# Patient Record
Sex: MALE | Race: WHITE | NOT HISPANIC OR LATINO | Employment: FULL TIME | URBAN - METROPOLITAN AREA
[De-identification: names, ages, dates, MRNs, and addresses within clinical notes are randomized per-mention and may not be internally consistent; named-entity substitution may affect disease eponyms.]

---

## 2020-05-20 ENCOUNTER — VIRTUAL VISIT (OUTPATIENT)
Dept: FAMILY MEDICINE | Facility: OTHER | Age: 30
End: 2020-05-20

## 2020-05-21 NOTE — PROGRESS NOTES
"Date: 2020 21:14:49  Clinician: Diallo Rangel  Clinician NPI: [id number]  Patient: Jeffery Saleem  Patient : 1990  Patient Address: [address] [place] [address]  Patient Phone: [phone]  Visit Protocol: URI  Patient Summary:  Jeffery is a 29 year old ( : 1990 ) male who initiated a Visit for cold, sinus infection, or influenza. When asked the question \"Please sign me up to receive news, health information and promotions from [employer].\", Jeffery responded \"No\".    Jeffery states his symptoms started suddenly 3-4 days ago.   His symptoms consist of a sore throat and enlarged lymph nodes. Jeffery also feels feverish.   Symptom details     Sore throat: Jeffery reports having moderate throat pain (4-6 on a 10 point pain scale), has exudate on his tonsils, and can swallow liquids. The lymph nodes in his neck are enlarged. A rash has not appeared on the skin since the sore throat started.     Temperature: His current temperature is 99.5 degrees Fahrenheit.      Jeffery denies having nausea, teeth pain, ageusia, diarrhea, facial pain or pressure, chills, wheezing, cough, nasal congestion, vomiting, rhinitis, ear pain, headache, malaise, myalgias, and anosmia. He also denies having recent facial or sinus surgery in the past 60 days, double sickening (worsening symptoms after initial improvement), and taking antibiotic medication for the symptoms. He is not experiencing dyspnea.   Precipitating events  Jeffery is not sure if he has been exposed to someone with strep throat.   Pertinent COVID-19 (Coronavirus) information  In the past 14 days, Jeffery has not worked in a congregate living setting.   He does not work or volunteer as healthcare worker or a  and does not work or volunteer in a healthcare facility.   Jeffery also has not lived in a congregate living setting in the past 14 days. He does not live with a healthcare worker.   Jeffery has not had a close contact with a laboratory-confirmed " COVID-19 patient within 14 days of symptom onset.   Triage Point(s) temporarily suspended for COVID-19 (Coronavirus) screening  Jeffery reported the following symptoms which were previously protocol referral points. These protocol referral points have temporarily been removed for purposes of COVID-19 (Coronavirus) screening.   Meets at least 3/5 centor score criteria     Swollen lymph nodes    Exudate on tonsils    Absence of cough         Pertinent medical history  Jeffery does not need a return to work/school note.   Weight: 152 lbs   Jeffery does not smoke or use smokeless tobacco.   Additional information as reported by the patient (free text): Have had strep before a long time ago, and this seems the exact same.   Weight: 152 lbs    MEDICATIONS: Vicks DayQuil-NyQuil oral, Tylenol oral, ALLERGIES: NKDA  Clinician Response:  Dear Jeffery,   your symptoms are most consistent with Strep throat. We will begin treatment with an antibiotic for this. You should improve within 48-72 hours. If you do not please return to [facility] or follow up with your PCP. this could be indicative of a different ailment.  Additionally, we can not rule out COVID 19 and do not meet criteria to be tested by [facility] at this time. Please take these precautions until we know if you respond to the medication prescribed today  How can I protect others?  Without a test, we can't know for sure that you have COVID-19. For safety, it's very important to follow these rules.  First, stay home and away from others (self-isolate) until:   You've had no fever---and no medicine that reduces fever---for 3 full days (72 hours). And...  Your other symptoms have gotten better. For example, your cough or breathing has improved. And...  At least 10 days have passed since your symptoms started.   During this time:   Don't go to work, school or anywhere else.  Stay away from others in your home. No hugging, kissing or shaking hands.  Don't let anyone visit.  Cover  your mouth and nose with a mask, tissue or wash cloth to avoid spreading germs.  Wash your hands and face often. Use soap and water.   How can I take care of myself?  1.Take Tylenol (acetaminophen) for fever or pain. If you have liver or kidney problems, ask your family doctor if it's okay to take Tylenol.  Adults can take either:   650 mg (two 325 mg pills) every 4 to 6 hours, or...  1,000 mg (two 500 mg pills) every 8 hours as needed.  Note: Don't take more than 3,000 mg in one day.  For children, check the Tylenol bottle for the right dose. The dose is based on the child's age or weight.     Diagnosis: Streptococcal pharyngitis  Diagnosis ICD: J02.0  Prescription: amoxicillin 500 mg oral capsule 30 capsule, 10 days supply. Take 1 capsule by mouth every 8 hours for 10 days. Refills: 0, Refill as needed: no, Allow substitutions: yes  Pharmacy: [facility]/pharmacy #[id number] - [phone] - [address] [place] [address]

## 2021-01-21 ENCOUNTER — VIRTUAL VISIT (OUTPATIENT)
Dept: FAMILY MEDICINE | Facility: OTHER | Age: 31
End: 2021-01-21

## 2021-01-21 NOTE — PROGRESS NOTES
"Date: 2021 09:38:51  Clinician: Estefani Palacios  Clinician NPI: [id number]  Patient: Jeffery Saleem  Patient : 1990  Patient Address: [address] [place] [address]  Patient Phone: [phone]  Visit Protocol: URI  Patient Summary:  Jeffery is a 30 year old ( : 1990 ) male who initiated a [facility] Visit for cold, sinus infection, or influenza. When asked the question \"Please sign me up to receive news, health information and promotions from [facility].\", Jeffery responded \"No\".    Jeffery states his symptoms started gradually 3-4 days ago. After his symptoms started, they improved and then got worse again.   His symptoms consist of malaise and a sore throat.   Symptom details   Sore throat: Jeffery reports having mild throat pain (1-3 on a 10 point pain scale), has exudate on his tonsils, and can swallow liquids. The lymph nodes in his neck are not enlarged. A rash has not appeared on the skin since the sore throat started.    Jeffery denies having ear pain, headache, chills, vomiting, myalgias, rhinitis, fever, enlarged lymph nodes, cough, nasal congestion, nausea, teeth pain, ageusia, diarrhea, wheezing, facial pain or pressure, and anosmia. He also denies having recent facial or sinus surgery in the past 60 days and taking antibiotic medication in the past month. He is not experiencing dyspnea.   Precipitating events  Jeffery is not sure if he has been exposed to someone with strep throat.   Pertinent COVID-19 (Coronavirus) information  Jeffery does not work or volunteer as healthcare worker or a . In the past 14 days, Jeffery has not worked or volunteered at a healthcare facility or group living setting.   In the past 14 days, he also has not lived in a congregate living setting.   Jeffery has not had a close contact with a laboratory-confirmed COVID-19 patient within 14 days of symptom onset.    Jeffery has been tested for COVID-19.      Date(s) of his COVID-19 test as reported by the patient (free " text): 01/21/2021       Result of COVID-19 test as reported by the patient (free text): Negative       Type of test as reported by the patient (free text): Nasal, rapid        Jeffery has not received a COVID-19 vaccine.   Pertinent medical history  He has not been told by his provider to avoid NSAIDs.   Jeffery does not have diabetes. He denies having immunosuppressive conditions (e.g., chemotherapy, HIV, organ transplant, long-term use of steroids or other immunosuppressive medications, splenectomy). He denies having congestive heart failure and severe COPD. He does not have asthma.   Jeffery does not need a return to work/school note.   Jeffery does not smoke or use smokeless tobacco.   Additional information as reported by the patient (free text): Also had a non-rapid deep nasal COVID test done at first signs of sore throat in addition to today's (1/21) rapid test. Both negative.   Weight: 155 lbs    MEDICATIONS: Tylenol oral, Vicks DayQuil-NyQuil oral, ALLERGIES: NKDA  Clinician Response:  Dear Jeffery,  Based on the information provided, you have viral pharyngitis. This is a sore throat caused by a virus and is usually the first sign of a cold. Your sore throat should resolve in a couple days as other cold symptoms develop.  Unfortunately, there are no medications that can cure a cold, so treatment is focused on controlling symptoms as much as possible until you recover. Most people gradually feel better in 1-2 weeks.  Medication information  Because you have a viral infection, antibiotics will not help you get better. Treating a viral infection with antibiotics could actually make you feel worse.  Unless you are allergic to the over-the-counter medication(s) below, I recommend using:       Acetaminophen (Tylenol or store brand) oral tablet. Take 1-2 tablets by mouth every 4-6 hours to help with the discomfort.      Ibuprofen (Advil or store brand) 200 mg oral tablet. Take 1-3 tablets (200-600 mg) by mouth every 8 hours to  help with the discomfort. Make sure to take the ibuprofen with food. Do not exceed 2400 mg in 24 hours.     Over-the-counter medications do not require a prescription. Ask the pharmacist if you have any questions.  Self care  Steps you can take to be as comfortable as possible:     Rest.    Drink plenty of fluids.    Use throat lozenges.    Suck on frozen items such as popsicles.    Drink hot tea with lemon and honey.    Gargle with warm salt water (1/4 teaspoon of salt per 8 ounce glass of water).     When to seek care  Please be seen in a clinic or urgent care if any of the following occur:   New symptoms develop, or symptoms become worse   Call ahead before going to the clinic or urgent care.  Call 911 or go to the emergency room if you feel that your throat is closing off, you suddenly develop a rash, you are unable to swallow fluids, you are drooling, or you are having difficulty breathing.  For the latest updates on COVID-19 (Coronavirus), please visit the Centers for Disease Control and Prevention (CDC).   Diagnosis: Viral pharyngitis  Diagnosis ICD: J02.9

## 2022-05-19 ASSESSMENT — ENCOUNTER SYMPTOMS
MYALGIAS: 0
HEMATOCHEZIA: 0
DYSURIA: 0
SORE THROAT: 0
HEMATURIA: 0
HEADACHES: 0
EYE PAIN: 0
ARTHRALGIAS: 0
PALPITATIONS: 0
FEVER: 0
NERVOUS/ANXIOUS: 0
WEAKNESS: 0
COUGH: 1
DIZZINESS: 0
FREQUENCY: 0
NAUSEA: 0
ABDOMINAL PAIN: 0
JOINT SWELLING: 0
HEARTBURN: 0
PARESTHESIAS: 0
CONSTIPATION: 0
DIARRHEA: 0
CHILLS: 0
SHORTNESS OF BREATH: 0

## 2022-05-20 ENCOUNTER — OFFICE VISIT (OUTPATIENT)
Dept: FAMILY MEDICINE | Facility: CLINIC | Age: 32
End: 2022-05-20
Payer: COMMERCIAL

## 2022-05-20 VITALS
TEMPERATURE: 98.4 F | OXYGEN SATURATION: 100 % | HEIGHT: 67 IN | DIASTOLIC BLOOD PRESSURE: 70 MMHG | HEART RATE: 81 BPM | SYSTOLIC BLOOD PRESSURE: 102 MMHG | WEIGHT: 151 LBS | BODY MASS INDEX: 23.7 KG/M2

## 2022-05-20 DIAGNOSIS — Z13.6 ENCOUNTER FOR LIPID SCREENING FOR CARDIOVASCULAR DISEASE: ICD-10-CM

## 2022-05-20 DIAGNOSIS — Z13.220 ENCOUNTER FOR LIPID SCREENING FOR CARDIOVASCULAR DISEASE: ICD-10-CM

## 2022-05-20 DIAGNOSIS — R09.82 POST-NASAL DRIP: ICD-10-CM

## 2022-05-20 DIAGNOSIS — Z00.00 ROUTINE GENERAL MEDICAL EXAMINATION AT A HEALTH CARE FACILITY: Primary | ICD-10-CM

## 2022-05-20 DIAGNOSIS — Z11.4 SCREENING FOR HIV (HUMAN IMMUNODEFICIENCY VIRUS): ICD-10-CM

## 2022-05-20 PROCEDURE — 99213 OFFICE O/P EST LOW 20 MIN: CPT | Mod: 25 | Performed by: FAMILY MEDICINE

## 2022-05-20 PROCEDURE — 99395 PREV VISIT EST AGE 18-39: CPT | Performed by: FAMILY MEDICINE

## 2022-05-20 RX ORDER — AZELASTINE 1 MG/ML
1 SPRAY, METERED NASAL 2 TIMES DAILY
Qty: 30 ML | Refills: 2 | Status: SHIPPED | OUTPATIENT
Start: 2022-05-20

## 2022-05-20 ASSESSMENT — ENCOUNTER SYMPTOMS
JOINT SWELLING: 0
PARESTHESIAS: 0
FREQUENCY: 0
SORE THROAT: 0
HEMATURIA: 0
DYSURIA: 0
CONSTIPATION: 0
HEADACHES: 0
COUGH: 1
ARTHRALGIAS: 0
FEVER: 0
DIZZINESS: 0
EYE PAIN: 0
CHILLS: 0
NAUSEA: 0
ABDOMINAL PAIN: 0
SHORTNESS OF BREATH: 0
HEMATOCHEZIA: 0
HEARTBURN: 0
PALPITATIONS: 0
DIARRHEA: 0
MYALGIAS: 0
WEAKNESS: 0
NERVOUS/ANXIOUS: 0

## 2022-05-20 ASSESSMENT — PAIN SCALES - GENERAL: PAINLEVEL: NO PAIN (0)

## 2022-05-20 NOTE — PROGRESS NOTES
astelin  SUBJECTIVE:   CC: Jeffery Saleem is an 31 year old male who presents for preventative health visit.     Patient has been advised of split billing requirements and indicates understanding: Yes  Healthy Habits:     Getting at least 3 servings of Calcium per day:  Yes    Bi-annual eye exam:  Yes    Dental care twice a year:  NO    Sleep apnea or symptoms of sleep apnea:  None    Diet:  Regular (no restrictions)    Frequency of exercise:  2-3 days/week    Duration of exercise:  30-45 minutes    Taking medications regularly:  Yes    Medication side effects:  None    PHQ-2 Total Score: 0    Additional concerns today:  No    Ability to successfully perform activities of daily living: Yes, no assistance needed  Home safety:  none identified   Hearing impairment: No    Cough  Fever, systemic signs resolved  Sore throat resolved  Several COVID tests were negative  Cough is worse at night, hacks in the morning, improved throughout the day      Today's PHQ-2 Score:   PHQ-2 ( 1999 Pfizer) 5/19/2022   Q1: Little interest or pleasure in doing things 0   Q2: Feeling down, depressed or hopeless 0   PHQ-2 Score 0   Q1: Little interest or pleasure in doing things Not at all   Q2: Feeling down, depressed or hopeless Not at all   PHQ-2 Score 0       Abuse: Current or Past(Physical, Sexual or Emotional)- No  Do you feel safe in your environment? Yes    Have you ever done Advance Care Planning? (For example, a Health Directive, POLST, or a discussion with a medical provider or your loved ones about your wishes): No, advance care planning information given to patient to review.  Patient declined advance care planning discussion at this time.    Social History     Tobacco Use     Smoking status: Not on file     Smokeless tobacco: Not on file   Substance Use Topics     Alcohol use: Not on file     If you drink alcohol do you typically have >3 drinks per day or >7 drinks per week? No    Alcohol Use 5/19/2022   Prescreen: >3 drinks/day  or >7 drinks/week? No   No flowsheet data found.    Last PSA: No results found for: PSA    Reviewed orders with patient. Reviewed health maintenance and updated orders accordingly - Yes  BP Readings from Last 3 Encounters:   05/20/22 102/70    Wt Readings from Last 3 Encounters:   05/20/22 68.5 kg (151 lb)                    Reviewed and updated as needed this visit by clinical staff                    Reviewed and updated as needed this visit by Provider                       Review of Systems   Constitutional: Negative for chills and fever.   HENT: Positive for congestion. Negative for ear pain, hearing loss and sore throat.    Eyes: Negative for pain and visual disturbance.   Respiratory: Positive for cough. Negative for shortness of breath.    Cardiovascular: Negative for chest pain, palpitations and peripheral edema.   Gastrointestinal: Negative for abdominal pain, constipation, diarrhea, heartburn, hematochezia and nausea.   Genitourinary: Negative for dysuria, frequency, genital sores, hematuria, impotence, penile discharge and urgency.   Musculoskeletal: Negative for arthralgias, joint swelling and myalgias.   Skin: Negative for rash.   Neurological: Negative for dizziness, weakness, headaches and paresthesias.   Psychiatric/Behavioral: Negative for mood changes. The patient is not nervous/anxious.        OBJECTIVE:   There were no vitals taken for this visit.    Physical Exam  GENERAL: healthy, alert and no distress  EYES: Eyes grossly normal to inspection, PERRL and conjunctivae and sclerae normal  HENT: ear canals and TM's normal, nose and mouth without ulcers or lesions  NECK: no adenopathy, no asymmetry, masses, or scars and thyroid normal to palpation  RESP: lungs clear to auscultation - no rales, rhonchi or wheezes  CV: regular rate and rhythm, normal S1 S2, no S3 or S4, no murmur, click or rub, no peripheral edema and peripheral pulses strong  ABDOMEN: soft, nontender, no hepatosplenomegaly, no  masses and bowel sounds normal  MS: no gross musculoskeletal defects noted, no edema  SKIN: no suspicious lesions or rashes  NEURO: Normal strength and tone, mentation intact and speech normal  PSYCH: mentation appears normal, affect normal/bright      ASSESSMENT/PLAN:       ICD-10-CM    1. Routine general medical examination at a health care facility  Z00.00 REVIEW OF HEALTH MAINTENANCE PROTOCOL ORDERS     Comprehensive metabolic panel     Lipid panel reflex to direct LDL Fasting   2. Post-nasal drip  R09.82 azelastine (ASTELIN) 0.1 % nasal spray   3. Screening for HIV (human immunodeficiency virus)  Z11.4    4. Encounter for lipid screening for cardiovascular disease  Z13.220 Lipid panel reflex to direct LDL Fasting    Z13.6        Patient has been advised of split billing requirements and indicates understanding: Yes    COUNSELING:   Reviewed preventive health counseling, as reflected in patient instructions       Regular exercise       Healthy diet/nutrition    There is no height or weight on file to calculate BMI.         He has no history on file for tobacco use.      Counseling Resources:  ATP IV Guidelines  Pooled Cohorts Equation Calculator  FRAX Risk Assessment  ICSI Preventive Guidelines  Dietary Guidelines for Americans, 2010  USDA's MyPlate  ASA Prophylaxis  Lung CA Screening    Ben Jones MD  [facility]

## 2022-05-20 NOTE — PATIENT INSTRUCTIONS
Preventive Health Recommendations  Male Ages 26 - 39    Yearly exam:             See your health care provider every year in order to  o   Review health changes.   o   Discuss preventive care.    o   Review your medicines if your doctor has prescribed any.  You should be tested each year for STDs (sexually transmitted diseases), if you re at risk.   After age 35, talk to your provider about cholesterol testing. If you are at risk for heart disease, have your cholesterol tested at least every 5 years.   If you are at risk for diabetes, you should have a diabetes test (fasting glucose).  Shots: Get a flu shot each year. Get a tetanus shot every 10 years.     Nutrition:  Eat at least 5 servings of fruits and vegetables daily.   Eat whole-grain bread, whole-wheat pasta and brown rice instead of white grains and rice.   Get adequate Calcium and Vitamin D.     Lifestyle  Exercise for at least 150 minutes a week (30 minutes a day, 5 days a week). This will help you control your weight and prevent disease.   Limit alcohol to one drink per day.   No smoking.   Wear sunscreen to prevent skin cancer.   See your dentist every six months for an exam and cleaning.       Healthy Lifestyle   Nutrition and healthy eating: The Mediterranean Diet  Ready to switch to a more heart-healthy diet? Here's how to get started with the Mediterranean diet.  By [facility] Staff   If you're looking for a heart-healthy eating plan, the Mediterranean diet might be right for you.  The Mediterranean diet blends the basics of healthy eating with the traditional flavors and cooking methods of the [place].  Interest in the Mediterranean diet began in the 1960s with the observation that coronary heart disease caused fewer deaths in Mediterranean countries, such as [place] and [place], than in the [place] and northern [place]. Subsequent studies found that the Mediterranean diet is associated with reduced risk factors for cardiovascular disease.  The  "Mediterranean diet is one of the healthy eating plans recommended by the Dietary Guidelines for Americans to promote health and prevent chronic disease.  It is also recognized by the World Health Organization as a healthy and sustainable dietary pattern and as an intangible cultural asset by the United National Educational, Scientific and Cultural Organization.  The Mediterranean diet is a way of eating based on the traditional cuisine of countries bordering the [place] Sea. While there is no single definition of the Mediterranean diet, it is typically high in vegetables, fruits, whole grains, beans, nut and seeds, and olive oil.  The main components of Mediterranean diet include:  Daily consumption of vegetables, fruits, whole grains and healthy fats   Weekly intake of fish, poultry, beans and eggs   Moderate portions of dairy products   Limited intake of red meat  Other important elements of the Mediterranean diet are sharing meals with family and friends, enjoying a glass of red wine and being physically active.  The foundation of the Mediterranean diet is vegetables, fruits, herbs, nuts, beans and whole grains. Meals are built around these plant-based foods. Moderate amounts of dairy, poultry and eggs are also central to the Mediterranean Diet, as is seafood. In contrast, red meat is eaten only occasionally.  Healthy fats are a mainstay of the Mediterranean diet. They're eaten instead of less healthy fats, such as saturated and trans fats, which contribute to heart disease.  Olive oil is the primary source of added fat in the Mediterranean diet. Olive oil provides monounsaturated fat, which has been found to lower total cholesterol and low-density lipoprotein (LDL or \"bad\") cholesterol levels. Nuts and seeds also contain monounsaturated fat.  Fish are also important in the Mediterranean diet. Fatty fish -- such as mackerel, herring, sardines, albacore tuna, salmon and lake trout -- are rich in omega-3 " fatty acids, a type of polyunsaturated fat that may reduce inflammation in the body. Omega-3 fatty acids also help decrease triglycerides, reduce blood clotting, and decrease the risk of stroke and heart failure.  The Mediterranean diet typically allows red wine in moderation. Although alcohol has been associated with a reduced risk of heart disease in some studies, it's by no means risk free. The Dietary Guidelines for Americans caution against beginning to drink or drinking more often on the basis of potential health benefits.  Interested in trying the Mediterranean diet? These tips will help you get started:  Eat more fruits and vegetables. Aim for 7 to 10 servings a day of fruit and vegetables.   Opt for whole grains. Switch to whole-grain bread, cereal and pasta. [place] with other whole grains, such as bulgur and farro.   Use healthy fats. Try olive oil as a replacement for butter when cooking. Instead of putting butter or margarine on bread, try dipping it in flavored olive oil.   Eat more seafood. Eat fish twice a week. Fresh or water-packed tuna, salmon, trout, mackerel and herring are healthy choices. Grilled fish tastes good and requires little cleanup. Avoid deep-fried fish.   Reduce red meat. Substitute fish, poultry or beans for meat. If you eat meat, make sure it's lean and keep portions small.   Enjoy some dairy. Eat low-fat Greek or plain yogurt and small amounts of a variety of cheeses.   Spice it up. Herbs and spices boost flavor and lessen the need for salt.  The Mediterranean diet is a delicious and healthy way to eat. Many people who switch to this style of eating say they'll never eat any other way.

## 2022-09-24 ENCOUNTER — HEALTH MAINTENANCE LETTER (OUTPATIENT)
Age: 32
End: 2022-09-24

## 2023-08-05 ENCOUNTER — HEALTH MAINTENANCE LETTER (OUTPATIENT)
Age: 33
End: 2023-08-05

## 2024-09-22 ENCOUNTER — HEALTH MAINTENANCE LETTER (OUTPATIENT)
Age: 34
End: 2024-09-22